# Patient Record
Sex: MALE | Race: WHITE | NOT HISPANIC OR LATINO | URBAN - METROPOLITAN AREA
[De-identification: names, ages, dates, MRNs, and addresses within clinical notes are randomized per-mention and may not be internally consistent; named-entity substitution may affect disease eponyms.]

---

## 2023-01-23 PROBLEM — N18.31 STAGE 3A CHRONIC KIDNEY DISEASE (MULTI): Status: ACTIVE | Noted: 2023-01-23

## 2023-01-23 PROBLEM — M79.673 FOOT PAIN: Status: ACTIVE | Noted: 2023-01-23

## 2023-01-23 PROBLEM — E55.9 MILD VITAMIN D DEFICIENCY: Status: ACTIVE | Noted: 2023-01-23

## 2023-01-23 PROBLEM — E11.9 DIABETES (MULTI): Status: ACTIVE | Noted: 2023-01-23

## 2023-01-23 PROBLEM — C67.9 BLADDER CANCER (MULTI): Status: ACTIVE | Noted: 2023-01-23

## 2023-01-23 PROBLEM — E66.9 OBESITY: Status: ACTIVE | Noted: 2023-01-23

## 2023-01-23 PROBLEM — E78.5 HYPERLIPIDEMIA: Status: ACTIVE | Noted: 2023-01-23

## 2023-01-23 PROBLEM — D70.9 NEUTROPENIA (CMS-HCC): Status: ACTIVE | Noted: 2023-01-23

## 2023-01-23 PROBLEM — I10 BENIGN ESSENTIAL HYPERTENSION: Status: ACTIVE | Noted: 2023-01-23

## 2023-01-23 RX ORDER — AMLODIPINE BESYLATE 5 MG/1
1 TABLET ORAL 2 TIMES DAILY
COMMUNITY
Start: 2019-01-15 | End: 2023-09-29

## 2023-01-23 RX ORDER — METFORMIN HYDROCHLORIDE 500 MG/1
1 TABLET, EXTENDED RELEASE ORAL 2 TIMES DAILY
COMMUNITY
Start: 2022-07-06 | End: 2023-06-29

## 2023-01-23 RX ORDER — ROSUVASTATIN CALCIUM 5 MG/1
1 TABLET, COATED ORAL DAILY
COMMUNITY
Start: 2014-11-14 | End: 2023-04-21

## 2023-01-23 RX ORDER — HYDROCHLOROTHIAZIDE 25 MG/1
1 TABLET ORAL DAILY
COMMUNITY
Start: 2022-06-21 | End: 2023-03-08 | Stop reason: SDUPTHER

## 2023-03-03 LAB
ALANINE AMINOTRANSFERASE (SGPT) (U/L) IN SER/PLAS: 17 U/L (ref 10–52)
ALBUMIN (G/DL) IN SER/PLAS: 4 G/DL (ref 3.4–5)
ALKALINE PHOSPHATASE (U/L) IN SER/PLAS: 38 U/L (ref 33–136)
ANION GAP IN SER/PLAS: 9 MMOL/L (ref 10–20)
ASPARTATE AMINOTRANSFERASE (SGOT) (U/L) IN SER/PLAS: 15 U/L (ref 9–39)
BILIRUBIN TOTAL (MG/DL) IN SER/PLAS: 0.4 MG/DL (ref 0–1.2)
CALCIDIOL (25 OH VITAMIN D3) (NG/ML) IN SER/PLAS: 31 NG/ML
CALCIUM (MG/DL) IN SER/PLAS: 9.1 MG/DL (ref 8.6–10.6)
CARBON DIOXIDE, TOTAL (MMOL/L) IN SER/PLAS: 28 MMOL/L (ref 21–32)
CHLORIDE (MMOL/L) IN SER/PLAS: 106 MMOL/L (ref 98–107)
CHOLESTEROL (MG/DL) IN SER/PLAS: 157 MG/DL (ref 0–199)
CHOLESTEROL IN HDL (MG/DL) IN SER/PLAS: 50.7 MG/DL
CHOLESTEROL/HDL RATIO: 3.1
CREATININE (MG/DL) IN SER/PLAS: 1.39 MG/DL (ref 0.5–1.3)
ERYTHROCYTE DISTRIBUTION WIDTH (RATIO) BY AUTOMATED COUNT: 12.4 % (ref 11.5–14.5)
ERYTHROCYTE MEAN CORPUSCULAR HEMOGLOBIN CONCENTRATION (G/DL) BY AUTOMATED: 32.2 G/DL (ref 32–36)
ERYTHROCYTE MEAN CORPUSCULAR VOLUME (FL) BY AUTOMATED COUNT: 93 FL (ref 80–100)
ERYTHROCYTES (10*6/UL) IN BLOOD BY AUTOMATED COUNT: 4.03 X10E12/L (ref 4.5–5.9)
GFR MALE: 53 ML/MIN/1.73M2
GLUCOSE (MG/DL) IN SER/PLAS: 156 MG/DL (ref 74–99)
HEMATOCRIT (%) IN BLOOD BY AUTOMATED COUNT: 37.6 % (ref 41–52)
HEMOGLOBIN (G/DL) IN BLOOD: 12.1 G/DL (ref 13.5–17.5)
LDL: 90 MG/DL (ref 0–99)
LEUKOCYTES (10*3/UL) IN BLOOD BY AUTOMATED COUNT: 3.8 X10E9/L (ref 4.4–11.3)
NRBC (PER 100 WBCS) BY AUTOMATED COUNT: 0 /100 WBC (ref 0–0)
PLATELETS (10*3/UL) IN BLOOD AUTOMATED COUNT: 215 X10E9/L (ref 150–450)
POTASSIUM (MMOL/L) IN SER/PLAS: 5 MMOL/L (ref 3.5–5.3)
PROSTATE SPECIFIC ANTIGEN,SCREEN: 0.18 NG/ML (ref 0–4)
PROTEIN TOTAL: 6.4 G/DL (ref 6.4–8.2)
SODIUM (MMOL/L) IN SER/PLAS: 138 MMOL/L (ref 136–145)
THYROTROPIN (MIU/L) IN SER/PLAS BY DETECTION LIMIT <= 0.05 MIU/L: 3.17 MIU/L (ref 0.44–3.98)
TRIGLYCERIDE (MG/DL) IN SER/PLAS: 82 MG/DL (ref 0–149)
UREA NITROGEN (MG/DL) IN SER/PLAS: 30 MG/DL (ref 6–23)
VLDL: 16 MG/DL (ref 0–40)

## 2023-03-08 ENCOUNTER — OFFICE VISIT (OUTPATIENT)
Dept: PRIMARY CARE | Facility: CLINIC | Age: 73
End: 2023-03-08
Payer: MEDICARE

## 2023-03-08 VITALS — BODY MASS INDEX: 31.82 KG/M2 | OXYGEN SATURATION: 96 % | HEART RATE: 58 BPM | WEIGHT: 234.6 LBS

## 2023-03-08 DIAGNOSIS — I10 BENIGN ESSENTIAL HYPERTENSION: ICD-10-CM

## 2023-03-08 DIAGNOSIS — C67.9 MALIGNANT NEOPLASM OF URINARY BLADDER, UNSPECIFIED SITE (MULTI): ICD-10-CM

## 2023-03-08 DIAGNOSIS — E11.9 TYPE 2 DIABETES MELLITUS WITHOUT COMPLICATION, WITHOUT LONG-TERM CURRENT USE OF INSULIN (MULTI): ICD-10-CM

## 2023-03-08 DIAGNOSIS — E55.9 MILD VITAMIN D DEFICIENCY: Primary | ICD-10-CM

## 2023-03-08 DIAGNOSIS — E78.5 HYPERLIPIDEMIA, UNSPECIFIED HYPERLIPIDEMIA TYPE: ICD-10-CM

## 2023-03-08 DIAGNOSIS — N18.31 STAGE 3A CHRONIC KIDNEY DISEASE (MULTI): ICD-10-CM

## 2023-03-08 DIAGNOSIS — D70.9 NEUTROPENIA, UNSPECIFIED TYPE (CMS-HCC): ICD-10-CM

## 2023-03-08 PROBLEM — M79.673 FOOT PAIN: Status: RESOLVED | Noted: 2023-01-23 | Resolved: 2023-03-08

## 2023-03-08 LAB — POC HEMOGLOBIN A1C: 7.9 % (ref 4.2–6.5)

## 2023-03-08 PROCEDURE — 1036F TOBACCO NON-USER: CPT | Performed by: INTERNAL MEDICINE

## 2023-03-08 PROCEDURE — 1159F MED LIST DOCD IN RCRD: CPT | Performed by: INTERNAL MEDICINE

## 2023-03-08 PROCEDURE — 83036 HEMOGLOBIN GLYCOSYLATED A1C: CPT | Performed by: INTERNAL MEDICINE

## 2023-03-08 PROCEDURE — 99214 OFFICE O/P EST MOD 30 MIN: CPT | Performed by: INTERNAL MEDICINE

## 2023-03-08 PROCEDURE — 1170F FXNL STATUS ASSESSED: CPT | Performed by: INTERNAL MEDICINE

## 2023-03-08 PROCEDURE — 1160F RVW MEDS BY RX/DR IN RCRD: CPT | Performed by: INTERNAL MEDICINE

## 2023-03-08 PROCEDURE — G0439 PPPS, SUBSEQ VISIT: HCPCS | Performed by: INTERNAL MEDICINE

## 2023-03-08 RX ORDER — HYDROCHLOROTHIAZIDE 25 MG/1
25 TABLET ORAL DAILY
Qty: 90 TABLET | Refills: 3 | Status: SHIPPED | OUTPATIENT
Start: 2023-03-08 | End: 2024-02-27

## 2023-03-08 ASSESSMENT — ENCOUNTER SYMPTOMS
LOSS OF SENSATION IN FEET: 0
OCCASIONAL FEELINGS OF UNSTEADINESS: 0
DEPRESSION: 0

## 2023-03-08 ASSESSMENT — PATIENT HEALTH QUESTIONNAIRE - PHQ9
1. LITTLE INTEREST OR PLEASURE IN DOING THINGS: NOT AT ALL
SUM OF ALL RESPONSES TO PHQ9 QUESTIONS 1 AND 2: 0
1. LITTLE INTEREST OR PLEASURE IN DOING THINGS: NOT AT ALL
1. LITTLE INTEREST OR PLEASURE IN DOING THINGS: NOT AT ALL
2. FEELING DOWN, DEPRESSED OR HOPELESS: NOT AT ALL
SUM OF ALL RESPONSES TO PHQ9 QUESTIONS 1 AND 2: 0
SUM OF ALL RESPONSES TO PHQ9 QUESTIONS 1 AND 2: 0

## 2023-03-08 ASSESSMENT — ACTIVITIES OF DAILY LIVING (ADL)
DRESSING: INDEPENDENT
BATHING: INDEPENDENT

## 2023-03-08 NOTE — PROGRESS NOTES
Subjective   Patient ID: Neftaly Bonner is a 72 y.o. male who presents for the following    HPI  73 yo Male presents to the office for the      Medicare wellness 3/8/2023 and the following     Patient is due for a physical at next appointment        Bladder Ca:  s/p removed the tumor. He goes for cystoscopies every 2 years. he did have reoccurrence of microscopic hematuria. he is once more following with dr monae      CKD stage 3: GFR 59 in 2021. Denies SOB, edema, or weight gain.      DM2: Patient denies any polyuria or dysuria. farixa was too much money and therefore not started. Gets yearly eye exam. No longer taking metformin, diet control. a1c 7.9 today      neutropenia: wbc 3-4k, he denies any frequent infections     HTN: Patient denies any headaches, blurred vision or dizziness. Patient denies any stroke like symptoms. On Ramipril 10 mg and Norvasc 5 mg QD. Check his BP at home, SBP in 130s     HLD: Patient denies any muscle aches and is tolerating statin therapy. On Rosuvastatin 5 mg QD. LDL 86     Vit D Deficiency:  takes vit d supplements        Social Hx: Patient denies any smoking, drug or alcohol abuse. H/o working in a chemical company      Family Hx: Mom  lung cancer. Dad passed heart issues 64 yo.      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 Will add jardiance      previous lab work reviewed in detail with patient     labs reviewed. PATIENT WILL NEED PSA AT NEXT LAB DRAW            Review of Systems    Constitutional: not feeling tired and no fever, chills or sweats. Denies weight loss    HEENT: no earache and no sore throat. no blurred vision and or double vision.  no headache  Cardiovascular: no exertional chest pain, no palpitations, no lower extremity edema and no intermittent leg claudication.   Lungs: Denies shortness of breath, exertional dyspnea, wheezing  Gastrointestinal: no change in bowel habits, no diarrhea, no nausea, no vomiting and no abdominal pain. Denies Melena, brbpr or dark stool  Musculoskeletal: no myalgias, no muscle weakness and no limb swelling.   Skin: no rashes, no change in skin color and pigmentation and no skin lumps.   Neurological: no headaches, no seizures, no numbness, no lateralizing deficits and no fainting.   Psychiatric: no depression and no anxiety.   Urine: denies polyuria, hematuria, dysuria   Endocrine: no cold intolerance, no heat intolerance    Objective     Visit Vitals  Pulse 58        Physical Exam    General appearance: Alert and in no acute distress. speech is clear and coherent  HEENT: Sclera and conjunctiva white, EOMI, uvela midline, no mouth lesions. PERRLA,  nasal turbinates are not swollen without exudate. TM's Quinteros with cone of light, external ear canal with scant cerumen. No head trauma  Neck: no carotid bruits or thyromegaly. no lymphadenopathy   Respiratory : No respiratory distress, normal respiratory rhythm and effort. Clear bilateral breath sounds. No wheezing or rhonchi.   Cardiovascular: heart rate regular, S1, S2. no murmurs. no Lower extremity edema  Skin inspection: Normal skin color and pigmentation, normal skin turgor and no visible rash, induration, or cellulitis  MSK: 5/5 strength upper and lower extremities without gait abnormalities. no loss of muscle mass   Neuro: 2-12 CN grossly intact.  no slurred speech. no lateralizing deficit  Psychiatric Orientation: Oriented to person, place, and time. no depression, homicidal or suicidal thoughts, normal affect  Abdomen: soft, none tender, none distended. no organomegaly     No family history on file.    Past Surgical History:   Procedure Laterality Date     APPENDECTOMY  07/08/2016    Laparoscopic Appendectomy            Jerry Ford DO

## 2023-04-21 DIAGNOSIS — E78.5 HYPERLIPIDEMIA, UNSPECIFIED: ICD-10-CM

## 2023-04-21 RX ORDER — ROSUVASTATIN CALCIUM 5 MG/1
TABLET, COATED ORAL
Qty: 90 TABLET | Refills: 3 | Status: SHIPPED | OUTPATIENT
Start: 2023-04-21 | End: 2024-04-15

## 2023-06-29 DIAGNOSIS — E11.9 TYPE 2 DIABETES MELLITUS WITHOUT COMPLICATIONS (MULTI): ICD-10-CM

## 2023-06-29 RX ORDER — METFORMIN HYDROCHLORIDE 500 MG/1
TABLET, EXTENDED RELEASE ORAL
Qty: 180 TABLET | Refills: 3 | Status: SHIPPED | OUTPATIENT
Start: 2023-06-29

## 2023-09-12 ENCOUNTER — OFFICE VISIT (OUTPATIENT)
Dept: PRIMARY CARE | Facility: CLINIC | Age: 73
End: 2023-09-12
Payer: MEDICARE

## 2023-09-12 ENCOUNTER — LAB (OUTPATIENT)
Dept: LAB | Facility: LAB | Age: 73
End: 2023-09-12
Payer: MEDICARE

## 2023-09-12 VITALS
SYSTOLIC BLOOD PRESSURE: 160 MMHG | BODY MASS INDEX: 31.21 KG/M2 | TEMPERATURE: 98.4 F | DIASTOLIC BLOOD PRESSURE: 80 MMHG | OXYGEN SATURATION: 95 % | HEART RATE: 52 BPM | WEIGHT: 230.4 LBS | HEIGHT: 72 IN

## 2023-09-12 DIAGNOSIS — R97.20 ELEVATED PROSTATE SPECIFIC ANTIGEN LESS THAN 10 NG/ML: ICD-10-CM

## 2023-09-12 DIAGNOSIS — E55.9 MILD VITAMIN D DEFICIENCY: ICD-10-CM

## 2023-09-12 DIAGNOSIS — E78.5 HYPERLIPIDEMIA, UNSPECIFIED HYPERLIPIDEMIA TYPE: ICD-10-CM

## 2023-09-12 DIAGNOSIS — I10 BENIGN ESSENTIAL HYPERTENSION: ICD-10-CM

## 2023-09-12 DIAGNOSIS — E11.9 TYPE 2 DIABETES MELLITUS WITHOUT COMPLICATION, WITHOUT LONG-TERM CURRENT USE OF INSULIN (MULTI): ICD-10-CM

## 2023-09-12 DIAGNOSIS — N18.30 STAGE 3 CHRONIC KIDNEY DISEASE, UNSPECIFIED WHETHER STAGE 3A OR 3B CKD (MULTI): ICD-10-CM

## 2023-09-12 DIAGNOSIS — D70.9 NEUTROPENIA, UNSPECIFIED TYPE (CMS-HCC): ICD-10-CM

## 2023-09-12 DIAGNOSIS — Z00.00 ANNUAL PHYSICAL EXAM: Primary | ICD-10-CM

## 2023-09-12 LAB
ALANINE AMINOTRANSFERASE (SGPT) (U/L) IN SER/PLAS: 15 U/L (ref 10–52)
ALBUMIN (G/DL) IN SER/PLAS: 4.4 G/DL (ref 3.4–5)
ALKALINE PHOSPHATASE (U/L) IN SER/PLAS: 38 U/L (ref 33–136)
ANION GAP IN SER/PLAS: 11 MMOL/L (ref 10–20)
ASPARTATE AMINOTRANSFERASE (SGOT) (U/L) IN SER/PLAS: 16 U/L (ref 9–39)
BILIRUBIN TOTAL (MG/DL) IN SER/PLAS: 0.4 MG/DL (ref 0–1.2)
CALCIDIOL (25 OH VITAMIN D3) (NG/ML) IN SER/PLAS: 29 NG/ML
CALCIUM (MG/DL) IN SER/PLAS: 9.5 MG/DL (ref 8.6–10.6)
CARBON DIOXIDE, TOTAL (MMOL/L) IN SER/PLAS: 26 MMOL/L (ref 21–32)
CHLORIDE (MMOL/L) IN SER/PLAS: 106 MMOL/L (ref 98–107)
CHOLESTEROL (MG/DL) IN SER/PLAS: 157 MG/DL (ref 0–199)
CHOLESTEROL IN HDL (MG/DL) IN SER/PLAS: 51.6 MG/DL
CHOLESTEROL/HDL RATIO: 3
CREATININE (MG/DL) IN SER/PLAS: 1.39 MG/DL (ref 0.5–1.3)
ERYTHROCYTE DISTRIBUTION WIDTH (RATIO) BY AUTOMATED COUNT: 12.5 % (ref 11.5–14.5)
ERYTHROCYTE MEAN CORPUSCULAR HEMOGLOBIN CONCENTRATION (G/DL) BY AUTOMATED: 31.4 G/DL (ref 32–36)
ERYTHROCYTE MEAN CORPUSCULAR VOLUME (FL) BY AUTOMATED COUNT: 99 FL (ref 80–100)
ERYTHROCYTES (10*6/UL) IN BLOOD BY AUTOMATED COUNT: 4.14 X10E12/L (ref 4.5–5.9)
GFR MALE: 53 ML/MIN/1.73M2
GLUCOSE (MG/DL) IN SER/PLAS: 174 MG/DL (ref 74–99)
HEMATOCRIT (%) IN BLOOD BY AUTOMATED COUNT: 41.1 % (ref 41–52)
HEMOGLOBIN (G/DL) IN BLOOD: 12.9 G/DL (ref 13.5–17.5)
LDL: 80 MG/DL (ref 0–99)
LEUKOCYTES (10*3/UL) IN BLOOD BY AUTOMATED COUNT: 4.8 X10E9/L (ref 4.4–11.3)
NRBC (PER 100 WBCS) BY AUTOMATED COUNT: 0 /100 WBC (ref 0–0)
PLATELETS (10*3/UL) IN BLOOD AUTOMATED COUNT: 225 X10E9/L (ref 150–450)
POC HEMOGLOBIN A1C: 7.9 % (ref 4.2–6.5)
POTASSIUM (MMOL/L) IN SER/PLAS: 4.7 MMOL/L (ref 3.5–5.3)
PROSTATE SPECIFIC ANTIGEN,SCREEN: 0.21 NG/ML (ref 0–4)
PROTEIN TOTAL: 7.1 G/DL (ref 6.4–8.2)
SODIUM (MMOL/L) IN SER/PLAS: 138 MMOL/L (ref 136–145)
THYROTROPIN (MIU/L) IN SER/PLAS BY DETECTION LIMIT <= 0.05 MIU/L: 3.45 MIU/L (ref 0.44–3.98)
TRIGLYCERIDE (MG/DL) IN SER/PLAS: 128 MG/DL (ref 0–149)
UREA NITROGEN (MG/DL) IN SER/PLAS: 29 MG/DL (ref 6–23)
VLDL: 26 MG/DL (ref 0–40)

## 2023-09-12 PROCEDURE — 1159F MED LIST DOCD IN RCRD: CPT | Performed by: INTERNAL MEDICINE

## 2023-09-12 PROCEDURE — 80053 COMPREHEN METABOLIC PANEL: CPT

## 2023-09-12 PROCEDURE — 84153 ASSAY OF PSA TOTAL: CPT

## 2023-09-12 PROCEDURE — 3079F DIAST BP 80-89 MM HG: CPT | Performed by: INTERNAL MEDICINE

## 2023-09-12 PROCEDURE — 1160F RVW MEDS BY RX/DR IN RCRD: CPT | Performed by: INTERNAL MEDICINE

## 2023-09-12 PROCEDURE — 99397 PER PM REEVAL EST PAT 65+ YR: CPT | Performed by: INTERNAL MEDICINE

## 2023-09-12 PROCEDURE — 1036F TOBACCO NON-USER: CPT | Performed by: INTERNAL MEDICINE

## 2023-09-12 PROCEDURE — 82306 VITAMIN D 25 HYDROXY: CPT

## 2023-09-12 PROCEDURE — 83036 HEMOGLOBIN GLYCOSYLATED A1C: CPT | Performed by: INTERNAL MEDICINE

## 2023-09-12 PROCEDURE — 84443 ASSAY THYROID STIM HORMONE: CPT

## 2023-09-12 PROCEDURE — 80061 LIPID PANEL: CPT

## 2023-09-12 PROCEDURE — 36415 COLL VENOUS BLD VENIPUNCTURE: CPT

## 2023-09-12 PROCEDURE — 3077F SYST BP >= 140 MM HG: CPT | Performed by: INTERNAL MEDICINE

## 2023-09-12 PROCEDURE — 85027 COMPLETE CBC AUTOMATED: CPT

## 2023-09-12 RX ORDER — DAPAGLIFLOZIN 10 MG/1
10 TABLET, FILM COATED ORAL DAILY
Qty: 90 TABLET | Refills: 3 | Status: SHIPPED | OUTPATIENT
Start: 2023-09-12 | End: 2023-12-18 | Stop reason: ALTCHOICE

## 2023-09-12 NOTE — PROGRESS NOTES
Subjective   Patient ID: Neftaly Bonner is a 73 y.o. male who presents for the following    PHYSICAL 2023    Medicare wellness 3/8/2023   Assessment/Plan   htn: stable, continue amlodipine to 5mg bid   follows with Dr Gaspar once a year    hld: stable, continue statin      bladder ca: follow up with urology      DM2: stable, patient already on 500mg XR metformin twice daily .   Add farxiga 10mg 2023    CKD stage 3: base cr 1.4, GFR 50s   Added farxiga 10mg daily 2023      colonoscopy 2022 with dr tomas 3 years      shingles vaccine recommended to get at pharmacy     HPI  Male presents to the office for the       Bladder Ca:  s/p removed the tumor. He goes for cystoscopies every 2 years. he did have reoccurrence of microscopic hematuria. he is once more following with dr monae      CKD stage 3: GFR 50s typically. Denies SOB, edema, or weight gain.   3     DM2: Patient denies any polyuria or dysuria. farixa was too much money and therefore not started. Gets yearly eye exam.   diet control.       neutropenia: wbc 3-4k, he denies any frequent infections     HTN: Patient denies any headaches, blurred vision or dizziness. Patient denies any stroke like symptoms. On Ramipril 10 mg and Norvasc 5 mg QD. Check his BP at home, SBP in 130s     HLD: Patient denies any muscle aches and is tolerating statin therapy. On Rosuvastatin 5 mg QD. LDL 86     Vit D Deficiency:  takes vit d supplements     Social Hx: Patient denies any smoking, drug or alcohol abuse. H/o working in a chemical company      Family Hx: Mom  lung cancer. Dad passed heart issues 62 yo.      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 speech is clear and coherent  HEENT: Sclera and conjunctiva white, EOMI, uvela midline, no mouth lesions. PERRLA,  nasal turbinates are not swollen without exudate. TM's Quinteros with cone of light, external ear canal with scant cerumen. No head trauma  Neck: no carotid bruits or thyromegaly. no lymphadenopathy   Respiratory : No respiratory distress, normal respiratory rhythm and effort. Clear bilateral breath sounds. No wheezing or rhonchi.   Cardiovascular: heart rate regular, S1, S2. no murmurs. no Lower extremity edema  Skin inspection: Normal skin color and pigmentation, normal skin turgor and no visible rash, induration, or cellulitis  MSK: 5/5 strength upper and lower extremities without gait abnormalities. no loss of muscle mass   Neuro: 2-12 CN grossly intact.  no slurred speech. no lateralizing deficit  Psychiatric Orientation: Oriented to person, place, and time. no depression, homicidal or suicidal thoughts, normal affect  Abdomen: soft, none tender, none distended. no organomegaly      REVIEW OF SYSTEMS   Constitutional: not feeling tired and no fever, chills or sweats. Denies weight loss    HEENT: no earache and no sore throat. no blurred vision and or double vision. no headache  Cardiovascular: no exertional chest pain, no palpitations, no lower extremity edema    Lungs: Denies shortness of breath, exertional dyspnea, wheezing  Gastrointestinal: no change in bowel habits, no diarrhea, no nausea, no vomiting and no abdominal pain.   Musculoskeletal: no myalgias, no muscle weakness and no limb swelling.   Skin: no rashes, no change in skin color and pigmentation and no skin lumps.   Neurological: no headaches, no seizures, no numbness, no lateralizing deficits and no fainting.   Psychiatric: no depression and no anxiety.   Urine: denies polyuria, hematuria, dysuria   Endocrine: no cold intolerance, no heat intolerance      Allergies   Allergen Reactions    Cat Dander Unknown        Current Outpatient Medications   Medication Sig Dispense Refill    amLODIPine (Norvasc) 5 mg tablet Take 1 tablet (5 mg) by mouth 2 times a day.      hydroCHLOROthiazide (HYDRODiuril) 25 mg tablet Take 1 tablet (25 mg) by mouth once daily. 90 tablet 3    metFORMIN XR (Glucophage-XR) 500 mg 24 hr tablet TAKE 1 TABLET TWICE A  tablet 3    rosuvastatin (Crestor) 5 mg tablet TAKE 1 TABLET BY MOUTH EVERY DAY 90 tablet 3     No current facility-administered medications for this visit.       Objective     No visits with results within 4 Month(s) from this visit.   Latest known visit with results is:   Office Visit on 03/08/2023   Component Date Value Ref Range Status    POC HEMOGLOBIN A1c 03/08/2023 7.9 (A)  4.2 - 6.5 % Final-Edited       Radiology: Reviewed imaging in powerchart.  No results found.    No family history on file.  Social History     Socioeconomic History    Marital status:      Spouse name: None    Number of children: None    Years of education: None    Highest education level: None   Occupational History    None   Tobacco Use    Smoking status: Never    Smokeless tobacco: Never   Substance and Sexual Activity    Alcohol use: Yes     Comment: socially    Drug use: Never    Sexual activity: None   Other Topics Concern    None   Social History Narrative    None     Social Determinants of Health     Financial Resource Strain: Not on file   Food Insecurity: Not on file   Transportation Needs: Not on file   Physical Activity: Not on file   Stress: Not on file   Social Connections: Not on file   Intimate Partner Violence: Not on file   Housing Stability: Not on file     Past Medical History:   Diagnosis Date    Cervicalgia 01/19/2017    Neck pain, bilateral    Encounter for general adult medical examination without abnormal findings 04/23/2014    Annual physical exam    Impaired fasting glucose 02/04/2020    Impaired fasting glucose    Personal history of other diseases of the circulatory  system 04/23/2014    History of abdominal aortic aneurysm (AAA)    Personal history of other diseases of the digestive system 01/19/2017    History of gastroesophageal reflux (GERD)    Personal history of other diseases of the musculoskeletal system and connective tissue 04/23/2014    History of muscle pain    Personal history of other mental and behavioral disorders 01/19/2017    History of anxiety     Past Surgical History:   Procedure Laterality Date    APPENDECTOMY  07/08/2016    Laparoscopic Appendectomy       Charting was completed using voice recognition technology and may include unintended errors.

## 2023-09-13 ENCOUNTER — TELEPHONE (OUTPATIENT)
Dept: PRIMARY CARE | Facility: CLINIC | Age: 73
End: 2023-09-13
Payer: MEDICARE

## 2023-09-13 NOTE — TELEPHONE ENCOUNTER
Called pt    He needs to increase his VIT D by 1000 or take 2000 total if he is not taking it to begin with r/t low vit d lab results    Left msg

## 2023-09-14 ENCOUNTER — TELEPHONE (OUTPATIENT)
Dept: PRIMARY CARE | Facility: CLINIC | Age: 73
End: 2023-09-14

## 2023-09-14 ENCOUNTER — TELEPHONE (OUTPATIENT)
Dept: PRIMARY CARE | Facility: CLINIC | Age: 73
End: 2023-09-14
Payer: MEDICARE

## 2023-09-14 NOTE — TELEPHONE ENCOUNTER
I let PT know about the Vitamin D.  He does not take Vitamin D at this time and will start with 1000.

## 2023-09-28 DIAGNOSIS — I10 ESSENTIAL (PRIMARY) HYPERTENSION: ICD-10-CM

## 2023-09-29 RX ORDER — AMLODIPINE BESYLATE 5 MG/1
5 TABLET ORAL 2 TIMES DAILY
Qty: 180 TABLET | Refills: 3 | Status: SHIPPED | OUTPATIENT
Start: 2023-09-29

## 2023-12-18 ENCOUNTER — OFFICE VISIT (OUTPATIENT)
Dept: PRIMARY CARE | Facility: CLINIC | Age: 73
End: 2023-12-18
Payer: MEDICARE

## 2023-12-18 VITALS
BODY MASS INDEX: 29.8 KG/M2 | OXYGEN SATURATION: 99 % | DIASTOLIC BLOOD PRESSURE: 72 MMHG | SYSTOLIC BLOOD PRESSURE: 152 MMHG | HEIGHT: 72 IN | HEART RATE: 54 BPM | WEIGHT: 220 LBS

## 2023-12-18 DIAGNOSIS — E11.9 TYPE 2 DIABETES MELLITUS WITHOUT COMPLICATION, WITHOUT LONG-TERM CURRENT USE OF INSULIN (MULTI): ICD-10-CM

## 2023-12-18 DIAGNOSIS — I10 BENIGN ESSENTIAL HYPERTENSION: ICD-10-CM

## 2023-12-18 DIAGNOSIS — N18.30 STAGE 3 CHRONIC KIDNEY DISEASE, UNSPECIFIED WHETHER STAGE 3A OR 3B CKD (MULTI): Primary | ICD-10-CM

## 2023-12-18 DIAGNOSIS — Z23 NEED FOR VACCINATION: ICD-10-CM

## 2023-12-18 LAB — POC HEMOGLOBIN A1C: 7.4 % (ref 4.2–6.5)

## 2023-12-18 PROCEDURE — 1159F MED LIST DOCD IN RCRD: CPT | Performed by: INTERNAL MEDICINE

## 2023-12-18 PROCEDURE — 83036 HEMOGLOBIN GLYCOSYLATED A1C: CPT | Performed by: INTERNAL MEDICINE

## 2023-12-18 PROCEDURE — 3077F SYST BP >= 140 MM HG: CPT | Performed by: INTERNAL MEDICINE

## 2023-12-18 PROCEDURE — 1160F RVW MEDS BY RX/DR IN RCRD: CPT | Performed by: INTERNAL MEDICINE

## 2023-12-18 PROCEDURE — 99214 OFFICE O/P EST MOD 30 MIN: CPT | Performed by: INTERNAL MEDICINE

## 2023-12-18 PROCEDURE — 3078F DIAST BP <80 MM HG: CPT | Performed by: INTERNAL MEDICINE

## 2023-12-18 PROCEDURE — 90471 IMMUNIZATION ADMIN: CPT | Performed by: INTERNAL MEDICINE

## 2023-12-18 PROCEDURE — 4010F ACE/ARB THERAPY RXD/TAKEN: CPT | Performed by: INTERNAL MEDICINE

## 2023-12-18 PROCEDURE — 1036F TOBACCO NON-USER: CPT | Performed by: INTERNAL MEDICINE

## 2023-12-18 PROCEDURE — 90715 TDAP VACCINE 7 YRS/> IM: CPT | Performed by: INTERNAL MEDICINE

## 2023-12-18 RX ORDER — RAMIPRIL 10 MG/1
10 CAPSULE ORAL EVERY 12 HOURS
COMMUNITY
Start: 2023-10-19 | End: 2024-01-17

## 2023-12-18 NOTE — PROGRESS NOTES
Subjective   Patient ID: Neftaly Bonner is a 73 y.o. male who presents for the following    Chronic disease follow up    PHYSICAL 2023    Medicare wellness 3/8/2023   Assessment/Plan   htn: stable,   continue amlodipine to 5mg bid  Hydrochlorothiazide 25mg daily   Ramipril 10mg q12 hrs.      follows with Dr Gaspar once a year    hld: stable, continue statin      bladder ca: follow up with urology      DM2: stable, 7.2 (2023)patient already on   500mg XR metformin twice daily .    (farxiga too expensive )    CKD stage 3: base cr 1.4, GFR 50s       colonoscopy 2022 with dr tomas 3 years      shingles vaccine recommended to get at pharmacy     HPI  Male presents to the office for the       Bladder Ca:  s/p removed the tumor. He goes for cystoscopies every 2 years. he did have reoccurrence of microscopic hematuria. he is once more following with dr monae      CKD stage 3: GFR 50s typically. Denies SOB, edema, or weight gain.   3     DM2: Patient denies any polyuria or dysuria. farixa was too much money and therefore not started. Gets yearly eye exam.   diet control.       neutropenia: wbc 3-4k, he denies any frequent infections     HTN: Patient denies any headaches, blurred vision or dizziness. Patient denies any stroke like symptoms. On Ramipril 10 mg and Norvasc 5 mg QD. Check his BP at home, SBP in 130s     HLD: Patient denies any muscle aches and is tolerating statin therapy. On Rosuvastatin 5 mg QD. LDL 86     Vit D Deficiency:  takes vit d supplements     Social Hx: Patient denies any smoking, drug or alcohol abuse. H/o working in a chemical company      Family Hx: Mom  lung cancer. Dad passed heart issues 64 yo.      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 speech is clear and coherent  HEENT: Sclera and conjunctiva white, EOMI,     Respiratory : No respiratory distress, normal respiratory rhythm and effort. Clear bilateral breath sounds. No wheezing or rhonchi.   Cardiovascular: heart rate regular, S1, S2. no murmurs. no Lower extremity edema  Skin inspection: Normal skin color and pigmentation, normal skin turgor and no visible rash, induration, or cellulitis  MSK: 5/5 strength upper and lower extremities without gait abnormalities. no loss of muscle mass   Neuro: 2-12 CN grossly intact.  no slurred speech. no lateralizing deficit  Psychiatric Orientation: Oriented to person, place, and time. no depression, homicidal or suicidal thoughts, normal affect       REVIEW OF SYSTEMS   Constitutional: not feeling tired and no fever, chills or sweats. Denies weight loss    HEENT: no earache and no sore throat. no blurred vision and or double vision. no headache  Cardiovascular: no exertional chest pain, no palpitations, no lower extremity edema    Lungs: Denies shortness of breath, exertional dyspnea, wheezing  Gastrointestinal: no change in bowel habits, no diarrhea, no nausea, no vomiting and no abdominal pain.   Musculoskeletal: no myalgias, no muscle weakness and no limb swelling.   Skin: no rashes, no change in skin color and pigmentation and no skin lumps.   Neurological: no headaches, no seizures, no numbness, no lateralizing deficits and no fainting.   Psychiatric: no depression and no anxiety.   Urine: denies polyuria, hematuria, dysuria   Endocrine: no cold intolerance, no heat intolerance      Allergies   Allergen Reactions    Cat Dander Unknown       Current Outpatient Medications   Medication Sig Dispense Refill    amLODIPine (Norvasc) 5 mg tablet TAKE 1 TABLET BY MOUTH TWICE A  tablet 3    hydroCHLOROthiazide (HYDRODiuril) 25 mg tablet Take 1 tablet (25 mg) by mouth once daily. 90 tablet 3    metFORMIN XR (Glucophage-XR) 500 mg 24 hr  tablet TAKE 1 TABLET TWICE A  tablet 3    ramipril (Altace) 10 mg capsule Take 1 capsule (10 mg) by mouth every 12 hours.      rosuvastatin (Crestor) 5 mg tablet TAKE 1 TABLET BY MOUTH EVERY DAY 90 tablet 3    dapagliflozin propanediol (Farxiga) 10 mg Take 1 tablet (10 mg) by mouth once daily. (Patient not taking: Reported on 12/18/2023) 90 tablet 3     No current facility-administered medications for this visit.       Objective     Office Visit on 12/18/2023   Component Date Value Ref Range Status    POC HEMOGLOBIN A1c 12/18/2023 7.4 (A)  4.2 - 6.5 % Final   Lab on 09/12/2023   Component Date Value Ref Range Status    WBC 09/12/2023 4.8  4.4 - 11.3 x10E9/L Final    nRBC 09/12/2023 0.0  0.0 - 0.0 /100 WBC Final    RBC 09/12/2023 4.14 (L)  4.50 - 5.90 x10E12/L Final    Hemoglobin 09/12/2023 12.9 (L)  13.5 - 17.5 g/dL Final    Hematocrit 09/12/2023 41.1  41.0 - 52.0 % Final    MCV 09/12/2023 99  80 - 100 fL Final    MCHC 09/12/2023 31.4 (L)  32.0 - 36.0 g/dL Final    Platelets 09/12/2023 225  150 - 450 x10E9/L Final    RDW 09/12/2023 12.5  11.5 - 14.5 % Final    Glucose 09/12/2023 174 (H)  74 - 99 mg/dL Final    Sodium 09/12/2023 138  136 - 145 mmol/L Final    Potassium 09/12/2023 4.7  3.5 - 5.3 mmol/L Final    Chloride 09/12/2023 106  98 - 107 mmol/L Final    Bicarbonate 09/12/2023 26  21 - 32 mmol/L Final    Anion Gap 09/12/2023 11  10 - 20 mmol/L Final    Urea Nitrogen 09/12/2023 29 (H)  6 - 23 mg/dL Final    Creatinine 09/12/2023 1.39 (H)  0.50 - 1.30 mg/dL Final    GFR MALE 09/12/2023 53 (A)  >90 mL/min/1.73m2 Final    Calcium 09/12/2023 9.5  8.6 - 10.6 mg/dL Final    Albumin 09/12/2023 4.4  3.4 - 5.0 g/dL Final    Alkaline Phosphatase 09/12/2023 38  33 - 136 U/L Final    Total Protein 09/12/2023 7.1  6.4 - 8.2 g/dL Final    AST 09/12/2023 16  9 - 39 U/L Final    Total Bilirubin 09/12/2023 0.4  0.0 - 1.2 mg/dL Final    ALT (SGPT) 09/12/2023 15  10 - 52 U/L Final    Cholesterol 09/12/2023 157  0 - 199 mg/dL  Final    HDL 09/12/2023 51.6  mg/dL Final    Cholesterol/HDL Ratio 09/12/2023 3.0   Final    LDL 09/12/2023 80  0 - 99 mg/dL Final    VLDL 09/12/2023 26  0 - 40 mg/dL Final    Triglycerides 09/12/2023 128  0 - 149 mg/dL Final    TSH 09/12/2023 3.45  0.44 - 3.98 mIU/L Final    Prostate Specific Antigen,Screen 09/12/2023 0.21  0.00 - 4.00 ng/mL Final    Vitamin D, 25-Hydroxy 09/12/2023 29 (A)  ng/mL Final   Office Visit on 09/12/2023   Component Date Value Ref Range Status    POC HEMOGLOBIN A1c 09/12/2023 7.9 (A)  4.2 - 6.5 % Final       Radiology: Reviewed imaging in powerchart.  No results found.    No family history on file.  Social History     Socioeconomic History    Marital status:      Spouse name: None    Number of children: None    Years of education: None    Highest education level: None   Occupational History    None   Tobacco Use    Smoking status: Never    Smokeless tobacco: Never   Substance and Sexual Activity    Alcohol use: Yes     Comment: socially    Drug use: Never    Sexual activity: None   Other Topics Concern    None   Social History Narrative    None     Social Determinants of Health     Financial Resource Strain: Not on file   Food Insecurity: Not on file   Transportation Needs: Not on file   Physical Activity: Not on file   Stress: Not on file   Social Connections: Not on file   Intimate Partner Violence: Not on file   Housing Stability: Not on file     Past Medical History:   Diagnosis Date    Cervicalgia 01/19/2017    Neck pain, bilateral    Encounter for general adult medical examination without abnormal findings 04/23/2014    Annual physical exam    Impaired fasting glucose 02/04/2020    Impaired fasting glucose    Personal history of other diseases of the circulatory system 04/23/2014    History of abdominal aortic aneurysm (AAA)    Personal history of other diseases of the digestive system 01/19/2017    History of gastroesophageal reflux (GERD)    Personal history of other  diseases of the musculoskeletal system and connective tissue 04/23/2014    History of muscle pain    Personal history of other mental and behavioral disorders 01/19/2017    History of anxiety     Past Surgical History:   Procedure Laterality Date    APPENDECTOMY  07/08/2016    Laparoscopic Appendectomy       Charting was completed using voice recognition technology and may include unintended errors.

## 2024-01-17 DIAGNOSIS — I10 ESSENTIAL (PRIMARY) HYPERTENSION: ICD-10-CM

## 2024-01-17 RX ORDER — RAMIPRIL 10 MG/1
10 CAPSULE ORAL EVERY 12 HOURS
Qty: 180 CAPSULE | Refills: 3 | Status: SHIPPED | OUTPATIENT
Start: 2024-01-17

## 2024-02-27 DIAGNOSIS — I10 BENIGN ESSENTIAL HYPERTENSION: ICD-10-CM

## 2024-02-27 RX ORDER — HYDROCHLOROTHIAZIDE 25 MG/1
25 TABLET ORAL DAILY
Qty: 90 TABLET | Refills: 3 | Status: SHIPPED | OUTPATIENT
Start: 2024-02-27

## 2024-04-15 DIAGNOSIS — E78.5 HYPERLIPIDEMIA, UNSPECIFIED: ICD-10-CM

## 2024-04-15 RX ORDER — ROSUVASTATIN CALCIUM 5 MG/1
TABLET, COATED ORAL
Qty: 90 TABLET | Refills: 3 | Status: SHIPPED | OUTPATIENT
Start: 2024-04-15

## 2024-06-04 DIAGNOSIS — E11.9 TYPE 2 DIABETES MELLITUS WITHOUT COMPLICATIONS (MULTI): ICD-10-CM

## 2024-06-18 RX ORDER — METFORMIN HYDROCHLORIDE 500 MG/1
500 TABLET, EXTENDED RELEASE ORAL 2 TIMES DAILY
Qty: 180 TABLET | Refills: 3 | Status: SHIPPED | OUTPATIENT
Start: 2024-06-18

## 2024-10-06 DIAGNOSIS — I10 ESSENTIAL (PRIMARY) HYPERTENSION: ICD-10-CM

## 2024-10-07 RX ORDER — AMLODIPINE BESYLATE 5 MG/1
5 TABLET ORAL 2 TIMES DAILY
Qty: 180 TABLET | Refills: 3 | Status: SHIPPED | OUTPATIENT
Start: 2024-10-07

## 2025-01-01 DIAGNOSIS — I10 ESSENTIAL (PRIMARY) HYPERTENSION: ICD-10-CM

## 2025-01-15 RX ORDER — RAMIPRIL 10 MG/1
10 CAPSULE ORAL EVERY 12 HOURS
Qty: 180 CAPSULE | Refills: 0 | Status: SHIPPED | OUTPATIENT
Start: 2025-01-15

## 2025-03-01 DIAGNOSIS — I10 BENIGN ESSENTIAL HYPERTENSION: ICD-10-CM

## 2025-03-03 RX ORDER — HYDROCHLOROTHIAZIDE 25 MG/1
25 TABLET ORAL DAILY
Qty: 90 TABLET | Refills: 3 | Status: SHIPPED | OUTPATIENT
Start: 2025-03-03

## 2025-03-12 ENCOUNTER — APPOINTMENT (OUTPATIENT)
Dept: PRIMARY CARE | Facility: CLINIC | Age: 75
End: 2025-03-12
Payer: MEDICARE

## 2025-03-12 VITALS
WEIGHT: 232 LBS | OXYGEN SATURATION: 96 % | DIASTOLIC BLOOD PRESSURE: 82 MMHG | HEART RATE: 50 BPM | HEIGHT: 72 IN | SYSTOLIC BLOOD PRESSURE: 150 MMHG | BODY MASS INDEX: 31.42 KG/M2

## 2025-03-12 DIAGNOSIS — E11.9 TYPE 2 DIABETES MELLITUS WITHOUT COMPLICATION, WITHOUT LONG-TERM CURRENT USE OF INSULIN (MULTI): Primary | ICD-10-CM

## 2025-03-12 DIAGNOSIS — E55.9 MILD VITAMIN D DEFICIENCY: ICD-10-CM

## 2025-03-12 DIAGNOSIS — R97.20 ELEVATED PROSTATE SPECIFIC ANTIGEN LESS THAN 10 NG/ML: ICD-10-CM

## 2025-03-12 DIAGNOSIS — N18.30 STAGE 3 CHRONIC KIDNEY DISEASE, UNSPECIFIED WHETHER STAGE 3A OR 3B CKD (MULTI): ICD-10-CM

## 2025-03-12 DIAGNOSIS — I10 BENIGN ESSENTIAL HYPERTENSION: ICD-10-CM

## 2025-03-12 LAB — POC HEMOGLOBIN A1C: 8.4 % (ref 4.2–6.5)

## 2025-03-12 PROCEDURE — 99214 OFFICE O/P EST MOD 30 MIN: CPT | Performed by: INTERNAL MEDICINE

## 2025-03-12 PROCEDURE — 3079F DIAST BP 80-89 MM HG: CPT | Performed by: INTERNAL MEDICINE

## 2025-03-12 PROCEDURE — G2211 COMPLEX E/M VISIT ADD ON: HCPCS | Performed by: INTERNAL MEDICINE

## 2025-03-12 PROCEDURE — 3077F SYST BP >= 140 MM HG: CPT | Performed by: INTERNAL MEDICINE

## 2025-03-12 PROCEDURE — 83036 HEMOGLOBIN GLYCOSYLATED A1C: CPT | Performed by: INTERNAL MEDICINE

## 2025-03-12 PROCEDURE — 1036F TOBACCO NON-USER: CPT | Performed by: INTERNAL MEDICINE

## 2025-03-12 PROCEDURE — 1159F MED LIST DOCD IN RCRD: CPT | Performed by: INTERNAL MEDICINE

## 2025-03-12 PROCEDURE — 1123F ACP DISCUSS/DSCN MKR DOCD: CPT | Performed by: INTERNAL MEDICINE

## 2025-03-12 NOTE — PROGRESS NOTES
Subjective   Patient ID: Neftaly Bonner is a 74 y.o. male who presents for the following    Chronic disease follow up    Chronic disease follow up    Patient refuses Medicare wellness  exam  Assessment/Plan   htn: stable, elevated today, magdi says that its normal at home. Continue to check bp at home. 2gram sodium diet recommended as there is slight swelling in bilateral legs today  continue amlodipine to 5mg bid  Hydrochlorothiazide 25mg daily   Ramipril 10mg q12 hrs.      follows with Dr Gaspar      hld: stable, continue statin      bladder ca: follow up with urology      DM2: stable, 7.2 (2023)  A1c 8.4 (3/12/25)   500mg XR metformin twice daily .    (farxiga too expensive )  Patient will likely need to go on glipizide but he does want to change his diet in the next three months to see if that changes any thing     CKD stage 3: base cr 1.4, GFR 50s       colonoscopy 2022 with dr tomas 3 years      shingles vaccine recommended to get at pharmacy     HPI  Male presents to the office for the       Bladder Ca:  s/p removed the tumor. He goes for cystoscopies every 2 years. he did have reoccurrence of microscopic hematuria. he is once more following with dr monae      CKD stage 3: GFR 50s typically. Denies SOB, edema, or weight gain.       DM2: Patient denies any polyuria or dysuria. farixa was too much money and therefore not started. Gets yearly eye exam.   diet control.       neutropenia: wbc 3-4k, he denies any frequent infections     HTN: Patient denies any headaches, blurred vision or dizziness. Patient denies any stroke like symptoms. On Ramipril 10 mg and Norvasc 5 mg QD. Check his BP at home, SBP in 130s     HLD: Patient denies any muscle aches and is tolerating statin therapy. On Rosuvastatin 5 mg QD. LDL 86     Vit D Deficiency:  takes vit d supplements     Social Hx: Patient denies any smoking, drug or alcohol abuse. H/o working in a chemical company      Family Hx: Mom   lung cancer. Dad passed heart issues 62 yo.      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 speech is clear and coherent  HEENT: Sclera and conjunctiva white, EOMI,     Respiratory : No respiratory distress, normal respiratory rhythm and effort. Clear bilateral breath sounds. No wheezing or rhonchi.   Cardiovascular: heart rate regular, S1, S2. no murmurs. no Lower extremity edema  Skin inspection: Normal skin color and pigmentation, normal skin turgor and no visible rash, induration, or cellulitis  MSK: 5/5 strength upper and lower extremities without gait abnormalities. no loss of muscle mass   Neuro: 2-12 CN grossly intact.  no slurred speech. no lateralizing deficit  Psychiatric Orientation: Oriented to person, place, and time. no depression, homicidal or suicidal thoughts, normal affect       REVIEW OF SYSTEMS   Constitutional: not feeling tired and no fever, chills or sweats. Denies weight loss    HEENT: no earache and no sore throat. no blurred vision and or double vision. no headache  Cardiovascular: no exertional chest pain, no palpitations, no lower extremity edema    Lungs: Denies shortness of breath, exertional dyspnea, wheezing  Gastrointestinal: no change in bowel habits, no diarrhea, no nausea, no vomiting and no abdominal pain.   Musculoskeletal: no myalgias, no muscle weakness and no limb swelling.   Skin: no rashes, no change in skin color and pigmentation and no skin lumps.   Neurological: no headaches, no seizures, no numbness, no lateralizing deficits and no fainting.   Psychiatric: no depression and no anxiety.   Urine: denies polyuria, hematuria, dysuria   Endocrine: no cold intolerance, no  heat intolerance      Allergies   Allergen Reactions    Cat Dander Unknown       Current Outpatient Medications   Medication Sig Dispense Refill    amLODIPine (Norvasc) 5 mg tablet TAKE 1 TABLET BY MOUTH TWICE A  tablet 3    hydroCHLOROthiazide (HYDRODiuril) 25 mg tablet TAKE 1 TABLET BY MOUTH EVERY DAY 90 tablet 3    metFORMIN  mg 24 hr tablet TAKE 1 TABLET BY MOUTH TWICE A  tablet 3    ramipril (Altace) 10 mg capsule TAKE 1 CAPSULE BY MOUTH EVERY 12 HOURS 180 capsule 0    rosuvastatin (Crestor) 5 mg tablet TAKE 1 TABLET BY MOUTH EVERY DAY 90 tablet 3     No current facility-administered medications for this visit.       Objective     No visits with results within 4 Month(s) from this visit.   Latest known visit with results is:   Office Visit on 12/18/2023   Component Date Value Ref Range Status    POC HEMOGLOBIN A1c 12/18/2023 7.4 (A)  4.2 - 6.5 % Final       Radiology: Reviewed imaging in powerchart.  No results found.    No family history on file.  Social History     Socioeconomic History    Marital status:    Tobacco Use    Smoking status: Never    Smokeless tobacco: Never   Substance and Sexual Activity    Alcohol use: Yes     Comment: socially    Drug use: Never     Past Medical History:   Diagnosis Date    Cervicalgia 01/19/2017    Neck pain, bilateral    Encounter for general adult medical examination without abnormal findings 04/23/2014    Annual physical exam    Impaired fasting glucose 02/04/2020    Impaired fasting glucose    Personal history of other diseases of the circulatory system 04/23/2014    History of abdominal aortic aneurysm (AAA)    Personal history of other diseases of the digestive system 01/19/2017    History of gastroesophageal reflux (GERD)    Personal history of other diseases of the musculoskeletal system and connective tissue 04/23/2014    History of muscle pain    Personal history of other mental and behavioral disorders 01/19/2017    History of anxiety      Past Surgical History:   Procedure Laterality Date    APPENDECTOMY  07/08/2016    Laparoscopic Appendectomy       Charting was completed using voice recognition technology and may include unintended errors.

## 2025-03-13 ENCOUNTER — TELEPHONE (OUTPATIENT)
Dept: PRIMARY CARE | Facility: CLINIC | Age: 75
End: 2025-03-13
Payer: MEDICARE

## 2025-03-13 LAB
25(OH)D3+25(OH)D2 SERPL-MCNC: 33 NG/ML (ref 30–100)
ALBUMIN SERPL-MCNC: 4 G/DL (ref 3.6–5.1)
ALBUMIN/CREAT UR: 1037 MG/G CREAT
ALP SERPL-CCNC: 39 U/L (ref 35–144)
ALT SERPL-CCNC: 13 U/L (ref 9–46)
ANION GAP SERPL CALCULATED.4IONS-SCNC: 6 MMOL/L (CALC) (ref 7–17)
AST SERPL-CCNC: 14 U/L (ref 10–35)
BILIRUB SERPL-MCNC: 0.5 MG/DL (ref 0.2–1.2)
BUN SERPL-MCNC: 41 MG/DL (ref 7–25)
CALCIUM SERPL-MCNC: 9.5 MG/DL (ref 8.6–10.3)
CHLORIDE SERPL-SCNC: 103 MMOL/L (ref 98–110)
CHOLEST SERPL-MCNC: 149 MG/DL
CHOLEST/HDLC SERPL: 3.2 (CALC)
CO2 SERPL-SCNC: 29 MMOL/L (ref 20–32)
CREAT SERPL-MCNC: 1.69 MG/DL (ref 0.7–1.28)
CREAT UR-MCNC: 98 MG/DL (ref 20–320)
EGFRCR SERPLBLD CKD-EPI 2021: 42 ML/MIN/1.73M2
ERYTHROCYTE [DISTWIDTH] IN BLOOD BY AUTOMATED COUNT: 12.6 % (ref 11–15)
GLUCOSE SERPL-MCNC: 187 MG/DL (ref 65–99)
HCT VFR BLD AUTO: 37.6 % (ref 38.5–50)
HDLC SERPL-MCNC: 46 MG/DL
HGB BLD-MCNC: 12.3 G/DL (ref 13.2–17.1)
LDLC SERPL CALC-MCNC: 80 MG/DL (CALC)
MCH RBC QN AUTO: 31.6 PG (ref 27–33)
MCHC RBC AUTO-ENTMCNC: 32.7 G/DL (ref 32–36)
MCV RBC AUTO: 96.7 FL (ref 80–100)
MICROALBUMIN UR-MCNC: 101.6 MG/DL
NONHDLC SERPL-MCNC: 103 MG/DL (CALC)
PLATELET # BLD AUTO: 261 THOUSAND/UL (ref 140–400)
PMV BLD REES-ECKER: 9.8 FL (ref 7.5–12.5)
POTASSIUM SERPL-SCNC: 5.1 MMOL/L (ref 3.5–5.3)
PROT SERPL-MCNC: 6.5 G/DL (ref 6.1–8.1)
PSA SERPL-MCNC: 0.22 NG/ML
RBC # BLD AUTO: 3.89 MILLION/UL (ref 4.2–5.8)
SODIUM SERPL-SCNC: 138 MMOL/L (ref 135–146)
TRIGL SERPL-MCNC: 132 MG/DL
TSH SERPL-ACNC: 2.88 MIU/L (ref 0.4–4.5)
WBC # BLD AUTO: 3.4 THOUSAND/UL (ref 3.8–10.8)

## 2025-03-13 NOTE — TELEPHONE ENCOUNTER
[facility] on Friday to discuss his results.

## 2025-03-13 NOTE — TELEPHONE ENCOUNTER
----- Message from Jerry Ford sent at 3/13/2025  7:07 AM EDT -----  Recommend a telephone appointment on Friday to discuss his results.

## 2025-03-14 ENCOUNTER — TELEMEDICINE (OUTPATIENT)
Dept: PRIMARY CARE | Facility: CLINIC | Age: 75
End: 2025-03-14
Payer: MEDICARE

## 2025-03-14 ENCOUNTER — TELEPHONE (OUTPATIENT)
Dept: PRIMARY CARE | Facility: CLINIC | Age: 75
End: 2025-03-14

## 2025-03-14 DIAGNOSIS — N18.32 STAGE 3B CHRONIC KIDNEY DISEASE (MULTI): Primary | ICD-10-CM

## 2025-03-14 DIAGNOSIS — Z01.89 ENCOUNTER FOR OTHER SPECIFIED SPECIAL EXAMINATIONS: ICD-10-CM

## 2025-03-14 DIAGNOSIS — R80.1 PERSISTENT PROTEINURIA: ICD-10-CM

## 2025-03-14 PROCEDURE — 99214 OFFICE O/P EST MOD 30 MIN: CPT | Performed by: INTERNAL MEDICINE

## 2025-03-14 PROCEDURE — 1123F ACP DISCUSS/DSCN MKR DOCD: CPT | Performed by: INTERNAL MEDICINE

## 2025-03-14 NOTE — PROGRESS NOTES
Subjective   Patient ID: Neftaly Bonner is a 74 y.o. male who presents for the following    Albumin/cr ratio    Virtual or Telephone Consent    An interactive audio and video telecommunication system which permits real time communications between the patient (at the originating site) and provider (at the distant site) was utilized to provide this telehealth service.   Verbal consent was requested and obtained from Neftaly Bonner on this date, 03/14/25 for a telehealth visit and the patient's location was confirmed at the time of the visit.      Patient refuses Medicare wellness  exam  Assessment/Plan   CKD stage 3: base cr 1.4, GFR 50s advancing to 1.69 albumin/cr 1037  Ramipril 10mg q12  Refer to dr rollins  Farxiga is very expensive, patient will talk with dr rollins about next steps  [place], ds dna  Hiv, hepatitis    Anemia?  Cbc with diff, iron b12 folate  Spep/upep,      htn: stable, elevated in office but normal at home. Continue to check bp at home.   2 gram sodium diet recommended as there is slight swelling in bilateral legs today  continue amlodipine to 5mg bid  Hydrochlorothiazide 25mg daily   Ramipril 10mg q12 hrs.      follows with Dr Gaspar      hld: stable, continue statin      bladder ca: follow up with urology      DM2: stable, 7.2 (12/18/2023)  A1c 8.4 (3/12/25)   500mg XR metformin twice daily .    (farxiga too expensive )  Patient will likely need to go on glipizide but he does want to change his diet in the next three months to see if that changes any thing        colonoscopy November 2022 with dr tomas 3 years      shingles vaccine recommended to get at pharmacy     HPI  Male presents to the office for the        CKD stage 3: GFR 50s typically. Appears to be advancing along with significant proteinuria. Denies SOB, edema, or weight gain.       Bladder Ca: 2005 s/p removed the tumor. He goes for cystoscopies every 2 years. he did have reoccurrence of microscopic hematuria. he is once more following  with dr monae     DM2: Patient denies any polyuria or dysuria. farixa was too much money and therefore not started. Gets yearly eye exam.   diet control.       neutropenia: wbc 3-4k, he denies any frequent infections     HTN: Patient denies any headaches, blurred vision or dizziness. Patient denies any stroke like symptoms. On Ramipril 10 mg and Norvasc 5 mg QD. Check his BP at home, SBP in 130s     HLD: Patient denies any muscle aches and is tolerating statin therapy. On Rosuvastatin 5 mg QD. LDL 86     Vit D Deficiency:  takes vit d supplements     Social Hx: Patient denies any smoking, drug or alcohol abuse. H/o working in a chemical company      Family Hx: Mom  lung cancer. Dad passed heart issues 64 yo.      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        Visit Vitals  Smoking Status Never     PHYSICAL EXAM             Allergies   Allergen Reactions    Cat Dander Unknown       Current Outpatient Medications   Medication Sig Dispense Refill    amLODIPine (Norvasc) 5 mg tablet TAKE 1 TABLET BY MOUTH TWICE A  tablet 3    hydroCHLOROthiazide (HYDRODiuril) 25 mg tablet TAKE 1 TABLET BY MOUTH EVERY DAY 90 tablet 3    metFORMIN  mg 24 hr tablet TAKE 1 TABLET BY MOUTH TWICE A  tablet 3    ramipril (Altace) 10 mg capsule TAKE 1 CAPSULE BY MOUTH EVERY 12 HOURS 180 capsule 0    rosuvastatin (Crestor) 5 mg tablet TAKE 1 TABLET BY MOUTH EVERY DAY 90 tablet 3     No current facility-administered medications for this visit.       Objective     Office Visit on 2025   Component Date Value Ref Range Status    WHITE BLOOD CELL COUNT 2025 3.4 (L)  3.8 - 10.8 Thousand/uL Final    RED BLOOD CELL COUNT 2025 3.89 (L)  4.20 - 5.80 Million/uL Final    HEMOGLOBIN 2025 12.3 (L)  13.2 - 17.1 g/dL Final    HEMATOCRIT 2025 37.6 (L)  38.5 - 50.0 % Final    MCV 2025 96.7  80.0 - 100.0 fL Final    MCH 2025 31.6  27.0 - 33.0 pg Final    MCHC 2025 32.7   32.0 - 36.0 g/dL Final    RDW 03/12/2025 12.6  11.0 - 15.0 % Final    PLATELET COUNT 03/12/2025 261  140 - 400 Thousand/uL Final    MPV 03/12/2025 9.8  7.5 - 12.5 fL Final    GLUCOSE 03/12/2025 187 (H)  65 - 99 mg/dL Final    UREA NITROGEN (BUN) 03/12/2025 41 (H)  7 - 25 mg/dL Final    CREATININE 03/12/2025 1.69 (H)  0.70 - 1.28 mg/dL Final    EGFR 03/12/2025 42 (L)  > OR = 60 mL/min/1.73m2 Final    SODIUM 03/12/2025 138  135 - 146 mmol/L Final    POTASSIUM 03/12/2025 5.1  3.5 - 5.3 mmol/L Final    CHLORIDE 03/12/2025 103  98 - 110 mmol/L Final    CARBON DIOXIDE 03/12/2025 29  20 - 32 mmol/L Final    ELECTROLYTE BALANCE 03/12/2025 6 (L)  7 - 17 mmol/L (calc) Final    CALCIUM 03/12/2025 9.5  8.6 - 10.3 mg/dL Final    PROTEIN, TOTAL 03/12/2025 6.5  6.1 - 8.1 g/dL Final    ALBUMIN 03/12/2025 4.0  3.6 - 5.1 g/dL Final    BILIRUBIN, TOTAL 03/12/2025 0.5  0.2 - 1.2 mg/dL Final    ALKALINE PHOSPHATASE 03/12/2025 39  35 - 144 U/L Final    AST 03/12/2025 14  10 - 35 U/L Final    ALT 03/12/2025 13  9 - 46 U/L Final    CHOLESTEROL, TOTAL 03/12/2025 149  <200 mg/dL Final    HDL CHOLESTEROL 03/12/2025 46  > OR = 40 mg/dL Final    TRIGLYCERIDES 03/12/2025 132  <150 mg/dL Final    LDL-CHOLESTEROL 03/12/2025 80  mg/dL (calc) Final    CHOL/HDLC RATIO 03/12/2025 3.2  <5.0 (calc) Final    NON HDL CHOLESTEROL 03/12/2025 103  <130 mg/dL (calc) Final    TSH W/REFLEX TO FT4 03/12/2025 2.88  0.40 - 4.50 mIU/L Final    VITAMIN D,25-OH,TOTAL,IA 03/12/2025 33  30 - 100 ng/mL Final    PSA, TOTAL 03/12/2025 0.22  < OR = 4.00 ng/mL Final    POC HEMOGLOBIN A1c 03/12/2025 8.4 (A)  4.2 - 6.5 % Final    CREATININE, RANDOM URINE 03/12/2025 98  20 - 320 mg/dL Final    ALBUMIN, URINE 03/12/2025 101.6  See Note: mg/dL Final    ALBUMIN/CREATININE RATIO, RANDOM U* 03/12/2025 1,037 (H)  <30 mg/g creat Final       Radiology: Reviewed imaging in powerchart.  No results found.    No family history on file.  Social History     Socioeconomic History    Marital  status:    Tobacco Use    Smoking status: Never    Smokeless tobacco: Never   Substance and Sexual Activity    Alcohol use: Yes     Comment: socially    Drug use: Never     Past Medical History:   Diagnosis Date    Cervicalgia 01/19/2017    Neck pain, bilateral    Encounter for general adult medical examination without abnormal findings 04/23/2014    Annual physical exam    Impaired fasting glucose 02/04/2020    Impaired fasting glucose    Personal history of other diseases of the circulatory system 04/23/2014    History of abdominal aortic aneurysm (AAA)    Personal history of other diseases of the digestive system 01/19/2017    History of gastroesophageal reflux (GERD)    Personal history of other diseases of the musculoskeletal system and connective tissue 04/23/2014    History of muscle pain    Personal history of other mental and behavioral disorders 01/19/2017    History of anxiety     Past Surgical History:   Procedure Laterality Date    APPENDECTOMY  07/08/2016    Laparoscopic Appendectomy       Charting was completed using voice recognition technology and may include unintended errors.

## 2025-03-19 ENCOUNTER — LAB (OUTPATIENT)
Dept: LAB | Facility: HOSPITAL | Age: 75
End: 2025-03-19
Payer: MEDICARE

## 2025-03-19 DIAGNOSIS — N18.32 CHRONIC KIDNEY DISEASE, STAGE 3B (MULTI): Primary | ICD-10-CM

## 2025-03-19 LAB
PROT SERPL-MCNC: 6.8 G/DL (ref 6.4–8.2)
PROT UR-ACNC: 79 MG/DL (ref 5–25)

## 2025-03-19 PROCEDURE — 86335 IMMUNFIX E-PHORSIS/URINE/CSF: CPT

## 2025-03-19 PROCEDURE — 84155 ASSAY OF PROTEIN SERUM: CPT

## 2025-03-19 PROCEDURE — 84165 PROTEIN E-PHORESIS SERUM: CPT

## 2025-03-19 PROCEDURE — 84156 ASSAY OF PROTEIN URINE: CPT

## 2025-03-19 PROCEDURE — 86334 IMMUNOFIX E-PHORESIS SERUM: CPT

## 2025-03-19 PROCEDURE — 84166 PROTEIN E-PHORESIS/URINE/CSF: CPT

## 2025-03-20 LAB
ANA SER QL IF: NORMAL
ANION GAP SERPL CALCULATED.4IONS-SCNC: 8 MMOL/L (CALC) (ref 7–17)
BUN SERPL-MCNC: 33 MG/DL (ref 7–25)
BUN/CREAT SERPL: 20 (CALC) (ref 6–22)
CALCIUM SERPL-MCNC: 9.3 MG/DL (ref 8.6–10.3)
CHLORIDE SERPL-SCNC: 102 MMOL/L (ref 98–110)
CO2 SERPL-SCNC: 27 MMOL/L (ref 20–32)
CREAT SERPL-MCNC: 1.69 MG/DL (ref 0.7–1.28)
DSDNA AB SER-ACNC: <1 IU/ML
EGFRCR SERPLBLD CKD-EPI 2021: 42 ML/MIN/1.73M2
GLUCOSE SERPL-MCNC: 177 MG/DL (ref 65–99)
HAV IGM SERPL QL IA: NORMAL
HBV CORE IGM SERPL QL IA: NORMAL
HBV SURFACE AG SERPL QL IA: NORMAL
HCV AB SERPL QL IA: NORMAL
HIV 1+2 AB+HIV1 P24 AG SERPL QL IA: NORMAL
POTASSIUM SERPL-SCNC: 4.9 MMOL/L (ref 3.5–5.3)
SODIUM SERPL-SCNC: 137 MMOL/L (ref 135–146)

## 2025-03-24 LAB
ALBUMIN MFR UR ELPH: 81.7 %
ALBUMIN: 4.1 G/DL (ref 3.4–5)
ALPHA 1 GLOBULIN: 0.3 G/DL (ref 0.2–0.6)
ALPHA 2 GLOBULIN: 0.7 G/DL (ref 0.4–1.1)
ALPHA1 GLOB MFR UR ELPH: 2.7 %
ALPHA2 GLOB MFR UR ELPH: 3 %
ANA PAT SER IF-IMP: ABNORMAL
ANA SER QL IF: POSITIVE
ANA TITR SER IF: ABNORMAL TITER
B-GLOBULIN MFR UR ELPH: 6.4 %
BETA GLOBULIN: 0.9 G/DL (ref 0.5–1.2)
CENTROMERE B AB SER-ACNC: ABNORMAL AI
DSDNA AB SER-ACNC: <1 IU/ML
DSDNA AB SER-ACNC: <1 IU/ML
ENA JO1 AB SER IA-ACNC: ABNORMAL AI
ENA RNP AB SER-ACNC: ABNORMAL AI
ENA SCL70 AB SER IA-ACNC: ABNORMAL AI
ENA SM AB SER IA-ACNC: ABNORMAL AI
ENA SM+RNP AB SER IA-ACNC: ABNORMAL AI
ENA SS-A AB SER IA-ACNC: ABNORMAL AI
ENA SS-B AB SER IA-ACNC: ABNORMAL AI
GAMMA GLOB MFR UR ELPH: 6.2 %
GAMMA GLOBULIN: 0.9 G/DL (ref 0.5–1.4)
IMMUNOFIXATION COMMENT: NORMAL
IMMUNOFIXATION COMMENT: NORMAL
LABORATORY COMMENT REPORT: ABNORMAL
NUCLEOSOME AB SER IA-ACNC: ABNORMAL AI
PATH REVIEW - SERUM IMMUNOFIXATION: NORMAL
PATH REVIEW - URINE IMMUNOFIXATION: NORMAL
PATH REVIEW-SERUM PROTEIN ELECTROPHORESIS: NORMAL
PATH REVIEW-URINE PROTEIN ELECTROPHORESIS: NORMAL
PROTEIN ELECTROPHORESIS COMMENT: NORMAL
RIBOSOMAL P AB SER-ACNC: ABNORMAL AI
URINE ELECTROPHORESIS COMMENT: NORMAL

## 2025-03-31 ENCOUNTER — TELEPHONE (OUTPATIENT)
Dept: PRIMARY CARE | Facility: CLINIC | Age: 75
End: 2025-03-31
Payer: MEDICARE

## 2025-04-02 DIAGNOSIS — E78.5 HYPERLIPIDEMIA, UNSPECIFIED: ICD-10-CM

## 2025-04-02 RX ORDER — ROSUVASTATIN CALCIUM 5 MG/1
5 TABLET, COATED ORAL DAILY
Qty: 90 TABLET | Refills: 3 | Status: SHIPPED | OUTPATIENT
Start: 2025-04-02

## 2025-04-03 NOTE — TELEPHONE ENCOUNTER
Patient would like to know why he is scheduled for a 1 month follow up appointment with . His previous appointment was a telehealth visit on 3/14/25. Patient said to call his cell phone- [phone]

## 2025-04-04 NOTE — TELEPHONE ENCOUNTER
Lvmtcb Patient is to establish with dr tressa rollins with nephrology and then I will see him in 6 months

## 2025-04-14 ENCOUNTER — TELEPHONE (OUTPATIENT)
Dept: PRIMARY CARE | Facility: CLINIC | Age: 75
End: 2025-04-14
Payer: MEDICARE

## 2025-04-14 NOTE — TELEPHONE ENCOUNTER
Patient is wanting to know why he needs to be seen in may when he was scheduled for June. Wasn't sure why he was told to follow up in a month when he already saw Dr. Almaguer. Please advise.

## 2025-04-19 DIAGNOSIS — I10 ESSENTIAL (PRIMARY) HYPERTENSION: ICD-10-CM

## 2025-04-21 RX ORDER — RAMIPRIL 10 MG/1
10 CAPSULE ORAL EVERY 12 HOURS
Qty: 180 CAPSULE | Refills: 0 | Status: SHIPPED | OUTPATIENT
Start: 2025-04-21

## 2025-05-05 ENCOUNTER — APPOINTMENT (OUTPATIENT)
Dept: PRIMARY CARE | Facility: CLINIC | Age: 75
End: 2025-05-05
Payer: MEDICARE

## 2025-06-13 ENCOUNTER — APPOINTMENT (OUTPATIENT)
Dept: PRIMARY CARE | Facility: CLINIC | Age: 75
End: 2025-06-13
Payer: MEDICARE

## 2025-06-20 DIAGNOSIS — E11.9 TYPE 2 DIABETES MELLITUS WITHOUT COMPLICATIONS: ICD-10-CM

## 2025-06-20 RX ORDER — METFORMIN HYDROCHLORIDE 500 MG/1
500 TABLET, EXTENDED RELEASE ORAL 2 TIMES DAILY
Qty: 180 TABLET | Refills: 3 | Status: SHIPPED | OUTPATIENT
Start: 2025-06-20

## 2025-07-17 DIAGNOSIS — I10 ESSENTIAL (PRIMARY) HYPERTENSION: ICD-10-CM

## 2025-07-18 RX ORDER — RAMIPRIL 10 MG/1
10 CAPSULE ORAL EVERY 12 HOURS
Qty: 180 CAPSULE | Refills: 3 | Status: SHIPPED | OUTPATIENT
Start: 2025-07-18

## 2025-08-13 LAB
NON-UH HIE ALB: 4 G/DL (ref 3.4–5)
NON-UH HIE APPEARANCE, U: ABNORMAL
NON-UH HIE BASO COUNT: 0.06 X1000 (ref 0–0.2)
NON-UH HIE BASOS %: 1.5 %
NON-UH HIE BILIRUBIN, U: ABNORMAL
NON-UH HIE BLOOD, U: ABNORMAL
NON-UH HIE BUN/CREAT RATIO: 20.5
NON-UH HIE BUN: 41 MG/DL (ref 9–23)
NON-UH HIE CALCIUM: 9.6 MG/DL (ref 8.7–10.4)
NON-UH HIE CALCULATED OSMOLALITY: 293 MOSM/KG (ref 275–295)
NON-UH HIE CHLORIDE: 105 MMOL/L (ref 98–107)
NON-UH HIE CO2, VENOUS: 28 MMOL/L (ref 20–31)
NON-UH HIE COLOR, U: ABNORMAL
NON-UH HIE CORRECTED CALCIUM: 9.6 MG/DL (ref 8.5–10.5)
NON-UH HIE CREATININE, URINE MG/DL: 68.4 MG/DL
NON-UH HIE CREATININE: 2 MG/DL (ref 0.6–1.1)
NON-UH HIE DIFF?: ABNORMAL
NON-UH HIE EOS COUNT: 0.11 X1000 (ref 0–0.5)
NON-UH HIE EOSIN %: 2.7 %
NON-UH HIE GFR AA: 39
NON-UH HIE GLOMERULAR FILTRATION RATE: 33 ML/MIN/1.73M?
NON-UH HIE GLUCOSE QUAL, U: ABNORMAL
NON-UH HIE GLUCOSE: 157 MG/DL (ref 74–106)
NON-UH HIE HCT: 40.1 % (ref 41–52)
NON-UH HIE HGB: 13.6 G/DL (ref 13.5–17.5)
NON-UH HIE HYALINE CAST: <1 #/HPF
NON-UH HIE I-PTH: 79 PG/ML (ref 18.4–80.1)
NON-UH HIE INSTR WBC: 4.1
NON-UH HIE K: 4.7 MMOL/L (ref 3.5–5.1)
NON-UH HIE KETONES, U: ABNORMAL
NON-UH HIE LEUKOCYTE ESTERASE, U: ABNORMAL
NON-UH HIE LYMPH %: 31.8 %
NON-UH HIE LYMPH COUNT: 1.32 X1000 (ref 1.2–4.8)
NON-UH HIE MAGNESIUM: 2.1 MG/DL (ref 1.6–2.6)
NON-UH HIE MCH: 31.2 PG (ref 27–34)
NON-UH HIE MCHC: 33.8 G/DL (ref 32–37)
NON-UH HIE MCV: 92.3 FL (ref 80–100)
NON-UH HIE MICROALBUMIN, URINE MG/L: 324 MG/L
NON-UH HIE MICROALBUMIN/CREATININE RATIO: 474 MG MALB/GM CREAT (ref 0–30)
NON-UH HIE MONO %: 9.3 %
NON-UH HIE MONO COUNT: 0.39 X1000 (ref 0.1–1)
NON-UH HIE MPV: 7.7 FL (ref 7.4–10.4)
NON-UH HIE NA: 140 MMOL/L (ref 135–145)
NON-UH HIE NEUTROPHIL %: 54.6 %
NON-UH HIE NEUTROPHIL COUNT (ANC): 2.26 X1000 (ref 1.4–8.8)
NON-UH HIE NITRITE, U: ABNORMAL
NON-UH HIE NUCLEATED RBC: 0 /100WBC
NON-UH HIE PH, U: 5 (ref 4.5–8)
NON-UH HIE PHOSPHORUS: 3.7 MG/DL (ref 2–5.1)
NON-UH HIE PLATELET: 212 X10 (ref 150–450)
NON-UH HIE PROTEIN, U: ABNORMAL
NON-UH HIE RBC/HPF, U: 1 #/HPF (ref 0–3)
NON-UH HIE RBC: 4.35 X10 (ref 4.7–6.1)
NON-UH HIE RDW: 13.7 % (ref 11.5–14.5)
NON-UH HIE SPECIFIC GRAVITY, U: 1.01 (ref 1–1.03)
NON-UH HIE U MICRO: ABNORMAL
NON-UH HIE URIC ACID: 7.2 MG/DL (ref 3.7–9.2)
NON-UH HIE UROBILINOGEN QUAL, U: ABNORMAL
NON-UH HIE VIT D 25: 36 NG/ML
NON-UH HIE WBC/HPF, U: 1 #/HPF (ref 0–5)
NON-UH HIE WBC: 4.1 X10 (ref 4.5–11)

## 2025-09-17 ENCOUNTER — APPOINTMENT (OUTPATIENT)
Dept: PRIMARY CARE | Facility: CLINIC | Age: 75
End: 2025-09-17
Payer: MEDICARE